# Patient Record
Sex: FEMALE | Race: WHITE | HISPANIC OR LATINO | Employment: UNEMPLOYED | URBAN - METROPOLITAN AREA
[De-identification: names, ages, dates, MRNs, and addresses within clinical notes are randomized per-mention and may not be internally consistent; named-entity substitution may affect disease eponyms.]

---

## 2018-03-12 ENCOUNTER — NON-PROVIDER VISIT (OUTPATIENT)
Dept: OBGYN | Facility: CLINIC | Age: 28
End: 2018-03-12

## 2018-03-12 DIAGNOSIS — Z32.01 PREGNANCY EXAMINATION OR TEST, POSITIVE RESULT: ICD-10-CM

## 2018-03-12 LAB
INT CON NEG: NEGATIVE
INT CON POS: POSITIVE
POC URINE PREGNANCY TEST: POSITIVE

## 2018-03-12 PROCEDURE — 81025 URINE PREGNANCY TEST: CPT | Performed by: OBSTETRICS & GYNECOLOGY

## 2018-04-03 ENCOUNTER — HOSPITAL ENCOUNTER (INPATIENT)
Facility: MEDICAL CENTER | Age: 28
LOS: 2 days | End: 2018-04-05
Attending: OBSTETRICS & GYNECOLOGY | Admitting: OBSTETRICS & GYNECOLOGY
Payer: MEDICAID

## 2018-04-03 ENCOUNTER — APPOINTMENT (OUTPATIENT)
Dept: RADIOLOGY | Facility: MEDICAL CENTER | Age: 28
End: 2018-04-03
Attending: STUDENT IN AN ORGANIZED HEALTH CARE EDUCATION/TRAINING PROGRAM
Payer: MEDICAID

## 2018-04-03 LAB
A1 MICROGLOB PLACENTAL VAG QL: POSITIVE
ABO GROUP BLD: NORMAL
APPEARANCE UR: ABNORMAL
BASOPHILS # BLD AUTO: 0.3 % (ref 0–1.8)
BASOPHILS # BLD AUTO: 0.3 % (ref 0–1.8)
BASOPHILS # BLD: 0.03 K/UL (ref 0–0.12)
BASOPHILS # BLD: 0.03 K/UL (ref 0–0.12)
BLD GP AB SCN SERPL QL: NORMAL
COLOR UR AUTO: ABNORMAL
EOSINOPHIL # BLD AUTO: 0.01 K/UL (ref 0–0.51)
EOSINOPHIL # BLD AUTO: 0.02 K/UL (ref 0–0.51)
EOSINOPHIL NFR BLD: 0.1 % (ref 0–6.9)
EOSINOPHIL NFR BLD: 0.2 % (ref 0–6.9)
ERYTHROCYTE [DISTWIDTH] IN BLOOD BY AUTOMATED COUNT: 42 FL (ref 35.9–50)
ERYTHROCYTE [DISTWIDTH] IN BLOOD BY AUTOMATED COUNT: 42.3 FL (ref 35.9–50)
GLUCOSE UR QL STRIP.AUTO: NEGATIVE MG/DL
GP B STREP DNA SPEC QL NAA+PROBE: NEGATIVE
HBV SURFACE AG SER QL: NEGATIVE
HCT VFR BLD AUTO: 38.4 % (ref 37–47)
HCT VFR BLD AUTO: 38.4 % (ref 37–47)
HGB BLD-MCNC: 12.6 G/DL (ref 12–16)
HGB BLD-MCNC: 12.7 G/DL (ref 12–16)
HIV 1+2 AB+HIV1 P24 AG SERPL QL IA: NON REACTIVE
IMM GRANULOCYTES # BLD AUTO: 0.04 K/UL (ref 0–0.11)
IMM GRANULOCYTES # BLD AUTO: 0.05 K/UL (ref 0–0.11)
IMM GRANULOCYTES NFR BLD AUTO: 0.4 % (ref 0–0.9)
IMM GRANULOCYTES NFR BLD AUTO: 0.5 % (ref 0–0.9)
KETONES UR QL STRIP.AUTO: 80 MG/DL
LEUKOCYTE ESTERASE UR QL STRIP.AUTO: ABNORMAL
LYMPHOCYTES # BLD AUTO: 2.04 K/UL (ref 1–4.8)
LYMPHOCYTES # BLD AUTO: 2.27 K/UL (ref 1–4.8)
LYMPHOCYTES NFR BLD: 21.1 % (ref 22–41)
LYMPHOCYTES NFR BLD: 21.4 % (ref 22–41)
MCH RBC QN AUTO: 27.9 PG (ref 27–33)
MCH RBC QN AUTO: 28.2 PG (ref 27–33)
MCHC RBC AUTO-ENTMCNC: 32.8 G/DL (ref 33.6–35)
MCHC RBC AUTO-ENTMCNC: 33.1 G/DL (ref 33.6–35)
MCV RBC AUTO: 85.1 FL (ref 81.4–97.8)
MCV RBC AUTO: 85.3 FL (ref 81.4–97.8)
MONOCYTES # BLD AUTO: 0.56 K/UL (ref 0–0.85)
MONOCYTES # BLD AUTO: 0.56 K/UL (ref 0–0.85)
MONOCYTES NFR BLD AUTO: 5.3 % (ref 0–13.4)
MONOCYTES NFR BLD AUTO: 5.8 % (ref 0–13.4)
NEUTROPHILS # BLD AUTO: 6.98 K/UL (ref 2–7.15)
NEUTROPHILS # BLD AUTO: 7.68 K/UL (ref 2–7.15)
NEUTROPHILS NFR BLD: 72.1 % (ref 44–72)
NEUTROPHILS NFR BLD: 72.5 % (ref 44–72)
NITRITE UR QL STRIP.AUTO: NEGATIVE
NRBC # BLD AUTO: 0 K/UL
NRBC # BLD AUTO: 0 K/UL
NRBC BLD-RTO: 0 /100 WBC
NRBC BLD-RTO: 0 /100 WBC
PH UR STRIP.AUTO: 5.5 [PH]
PLATELET # BLD AUTO: 278 K/UL (ref 164–446)
PLATELET # BLD AUTO: 294 K/UL (ref 164–446)
PMV BLD AUTO: 10.9 FL (ref 9–12.9)
PMV BLD AUTO: 10.9 FL (ref 9–12.9)
PROT UR QL STRIP: 30 MG/DL
RBC # BLD AUTO: 4.5 M/UL (ref 4.2–5.4)
RBC # BLD AUTO: 4.51 M/UL (ref 4.2–5.4)
RBC UR QL AUTO: ABNORMAL
RH BLD: NORMAL
RUBV AB SER QL: 411 IU/ML
SP GR UR: 1.02
TREPONEMA PALLIDUM IGG+IGM AB [PRESENCE] IN SERUM OR PLASMA BY IMMUNOASSAY: NON REACTIVE
WBC # BLD AUTO: 10.6 K/UL (ref 4.8–10.8)
WBC # BLD AUTO: 9.7 K/UL (ref 4.8–10.8)

## 2018-04-03 PROCEDURE — 36415 COLL VENOUS BLD VENIPUNCTURE: CPT

## 2018-04-03 PROCEDURE — 303615 HCHG EPIDURAL/SPINAL ANESTHESIA FOR LABOR

## 2018-04-03 PROCEDURE — 85025 COMPLETE CBC W/AUTO DIFF WBC: CPT

## 2018-04-03 PROCEDURE — 304965 HCHG RECOVERY SERVICES

## 2018-04-03 PROCEDURE — 4A1HXCZ MONITORING OF PRODUCTS OF CONCEPTION, CARDIAC RATE, EXTERNAL APPROACH: ICD-10-PCS | Performed by: OBSTETRICS & GYNECOLOGY

## 2018-04-03 PROCEDURE — 76815 OB US LIMITED FETUS(S): CPT

## 2018-04-03 PROCEDURE — 700111 HCHG RX REV CODE 636 W/ 250 OVERRIDE (IP)

## 2018-04-03 PROCEDURE — 770002 HCHG ROOM/CARE - OB PRIVATE (112)

## 2018-04-03 PROCEDURE — 81002 URINALYSIS NONAUTO W/O SCOPE: CPT

## 2018-04-03 PROCEDURE — 86762 RUBELLA ANTIBODY: CPT

## 2018-04-03 PROCEDURE — 86780 TREPONEMA PALLIDUM: CPT

## 2018-04-03 PROCEDURE — 87340 HEPATITIS B SURFACE AG IA: CPT

## 2018-04-03 PROCEDURE — 700101 HCHG RX REV CODE 250

## 2018-04-03 PROCEDURE — 700105 HCHG RX REV CODE 258: Performed by: STUDENT IN AN ORGANIZED HEALTH CARE EDUCATION/TRAINING PROGRAM

## 2018-04-03 PROCEDURE — 700111 HCHG RX REV CODE 636 W/ 250 OVERRIDE (IP): Performed by: STUDENT IN AN ORGANIZED HEALTH CARE EDUCATION/TRAINING PROGRAM

## 2018-04-03 PROCEDURE — 87653 STREP B DNA AMP PROBE: CPT

## 2018-04-03 PROCEDURE — 87389 HIV-1 AG W/HIV-1&-2 AB AG IA: CPT

## 2018-04-03 PROCEDURE — 59409 OBSTETRICAL CARE: CPT

## 2018-04-03 PROCEDURE — 84112 EVAL AMNIOTIC FLUID PROTEIN: CPT

## 2018-04-03 RX ORDER — MISOPROSTOL 200 UG/1
800 TABLET ORAL
Status: DISCONTINUED | OUTPATIENT
Start: 2018-04-03 | End: 2018-04-03 | Stop reason: HOSPADM

## 2018-04-03 RX ORDER — ONDANSETRON 2 MG/ML
4 INJECTION INTRAMUSCULAR; INTRAVENOUS EVERY 6 HOURS PRN
Status: DISCONTINUED | OUTPATIENT
Start: 2018-04-03 | End: 2018-04-05 | Stop reason: HOSPADM

## 2018-04-03 RX ORDER — HYDROXYZINE 50 MG/1
50 TABLET, FILM COATED ORAL EVERY 6 HOURS PRN
Status: DISCONTINUED | OUTPATIENT
Start: 2018-04-03 | End: 2018-04-03 | Stop reason: HOSPADM

## 2018-04-03 RX ORDER — SODIUM CHLORIDE, SODIUM LACTATE, POTASSIUM CHLORIDE, CALCIUM CHLORIDE 600; 310; 30; 20 MG/100ML; MG/100ML; MG/100ML; MG/100ML
INJECTION, SOLUTION INTRAVENOUS CONTINUOUS
Status: DISCONTINUED | OUTPATIENT
Start: 2018-04-03 | End: 2018-04-03 | Stop reason: HOSPADM

## 2018-04-03 RX ORDER — SODIUM CHLORIDE, SODIUM LACTATE, POTASSIUM CHLORIDE, CALCIUM CHLORIDE 600; 310; 30; 20 MG/100ML; MG/100ML; MG/100ML; MG/100ML
INJECTION, SOLUTION INTRAVENOUS CONTINUOUS
Status: DISCONTINUED | OUTPATIENT
Start: 2018-04-03 | End: 2018-04-05 | Stop reason: HOSPADM

## 2018-04-03 RX ORDER — OXYTOCIN 10 [USP'U]/ML
10 INJECTION, SOLUTION INTRAMUSCULAR; INTRAVENOUS
Status: DISCONTINUED | OUTPATIENT
Start: 2018-04-03 | End: 2018-04-03 | Stop reason: HOSPADM

## 2018-04-03 RX ORDER — ROPIVACAINE HYDROCHLORIDE 2 MG/ML
INJECTION, SOLUTION EPIDURAL; INFILTRATION; PERINEURAL
Status: COMPLETED
Start: 2018-04-03 | End: 2018-04-03

## 2018-04-03 RX ORDER — METHYLERGONOVINE MALEATE 0.2 MG/ML
0.2 INJECTION INTRAVENOUS
Status: DISCONTINUED | OUTPATIENT
Start: 2018-04-03 | End: 2018-04-03 | Stop reason: HOSPADM

## 2018-04-03 RX ORDER — ONDANSETRON 4 MG/1
4 TABLET, ORALLY DISINTEGRATING ORAL EVERY 6 HOURS PRN
Status: DISCONTINUED | OUTPATIENT
Start: 2018-04-03 | End: 2018-04-05 | Stop reason: HOSPADM

## 2018-04-03 RX ORDER — ALUMINA, MAGNESIA, AND SIMETHICONE 2400; 2400; 240 MG/30ML; MG/30ML; MG/30ML
20 SUSPENSION ORAL EVERY 6 HOURS PRN
Status: DISCONTINUED | OUTPATIENT
Start: 2018-04-03 | End: 2018-04-03 | Stop reason: HOSPADM

## 2018-04-03 RX ORDER — CARBOPROST TROMETHAMINE 250 UG/ML
250 INJECTION, SOLUTION INTRAMUSCULAR
Status: DISCONTINUED | OUTPATIENT
Start: 2018-04-03 | End: 2018-04-03 | Stop reason: HOSPADM

## 2018-04-03 RX ORDER — PENICILLIN G POTASSIUM 5000000 [IU]/1
5 INJECTION, POWDER, FOR SOLUTION INTRAMUSCULAR; INTRAVENOUS ONCE
Status: COMPLETED | OUTPATIENT
Start: 2018-04-03 | End: 2018-04-03

## 2018-04-03 RX ORDER — PENICILLIN G POTASSIUM 5000000 [IU]/1
INJECTION, POWDER, FOR SOLUTION INTRAMUSCULAR; INTRAVENOUS
Status: DISCONTINUED
Start: 2018-04-03 | End: 2018-04-04

## 2018-04-03 RX ADMIN — SODIUM CHLORIDE 2.5 MILLION UNITS: 9 INJECTION, SOLUTION INTRAVENOUS at 18:05

## 2018-04-03 RX ADMIN — PENICILLIN G POTASSIUM 5 MILLION UNITS: 5000000 POWDER, FOR SOLUTION INTRAMUSCULAR; INTRAPLEURAL; INTRATHECAL; INTRAVENOUS at 13:37

## 2018-04-03 RX ADMIN — Medication 125 ML/HR: at 20:48

## 2018-04-03 RX ADMIN — SODIUM CHLORIDE, POTASSIUM CHLORIDE, SODIUM LACTATE AND CALCIUM CHLORIDE: 600; 310; 30; 20 INJECTION, SOLUTION INTRAVENOUS at 17:23

## 2018-04-03 RX ADMIN — Medication 1 MILLI-UNITS/MIN: at 14:52

## 2018-04-03 RX ADMIN — ROPIVACAINE HYDROCHLORIDE 100 ML: 2 INJECTION, SOLUTION EPIDURAL; INFILTRATION at 18:01

## 2018-04-03 RX ADMIN — FENTANYL CITRATE 100 MCG: 50 INJECTION, SOLUTION INTRAMUSCULAR; INTRAVENOUS at 16:29

## 2018-04-03 RX ADMIN — FENTANYL CITRATE 100 MCG: 50 INJECTION, SOLUTION INTRAMUSCULAR; INTRAVENOUS at 15:29

## 2018-04-03 RX ADMIN — SODIUM CHLORIDE, POTASSIUM CHLORIDE, SODIUM LACTATE AND CALCIUM CHLORIDE: 600; 310; 30; 20 INJECTION, SOLUTION INTRAVENOUS at 13:00

## 2018-04-03 ASSESSMENT — LIFESTYLE VARIABLES
DO YOU DRINK ALCOHOL: NO
ALCOHOL_USE: NO
EVER_SMOKED: NEVER

## 2018-04-03 ASSESSMENT — PAIN SCALES - GENERAL
PAINLEVEL_OUTOF10: 0
PAINLEVEL_OUTOF10: 5
PAINLEVEL_OUTOF10: 2

## 2018-04-03 NOTE — H&P
"History and Physical      Neyda Jhaveri is a 28 y.o. year old female  at 34w by LMP and 35w6d by US done today who presents for uterine contractions and leaking of fluids     Subjective:   positive  For CTXS.   positive Feels pain   positive for LOF  negative for vaginal bleeding.   positive for fetal movement    No LMP recorded.  Not found.    ROS: Patient denies fever, chills, nausea, vomiting , headache, visual disturbance, or dysuria.    No past medical history on file.  No past surgical history on file.  OB History   No data available     Social History     Social History   • Marital status: Single     Spouse name: N/A   • Number of children: N/A   • Years of education: N/A     Occupational History   • Not on file.     Social History Main Topics   • Smoking status: Not on file   • Smokeless tobacco: Not on file   • Alcohol use Not on file   • Drug use: Unknown   • Sexual activity: Not on file     Other Topics Concern   • Not on file     Social History Narrative   • No narrative on file     Allergies: Patient has no known allergies.  No current facility-administered medications for this encounter.     Prenatal care in [place] and no prenatal care since the patient moved to [place] 1m ago. No documentation form [place] available at this time.     There are no active problems to display for this patient.      Objective:      Blood pressure 111/70, pulse 86, height 1.68 m (5' 6.14\"), weight 68 kg (149 lb 14.6 oz).    General:   Afebrile, NAD   Skin:   No rash or lesion   HEENT:  NCAT, EOMI, non-icteric, mmm   Lungs:   CTAB   Heart:   RRR, NMGR   Abdomen:   gravid, NT   EFW:  2700-3000g   Pelvis:  adequate with gynecoid pelvis   FHT:  150 BPM   Uterine Size: S=D   Presentations: Cephalic   Cervix:     Dilation: 1cm    Effacement: 50%    Station:  -2    Consistency: Medium    Position: Middle     Lab Review  Lab:   Blood type: O     Recent Results (from the past 5880 hour(s))   POCT Pregnancy    " Collection Time: 03/12/18  1:22 PM   Result Value Ref Range    POC Urine Pregnancy Test Positive Negative    Internal Control Positive Positive     Internal Control Negative Negative    IP OBSTETRIC PANEL-BB (ABORH/ANTIBODY SCREEN)    Collection Time: 04/03/18  9:29 AM   Result Value Ref Range    ABO Grouping Only O     Rh Grouping Only POS     Antibody Screen Scrn NEG    OBSTETRIC PANEL (CBCD, RPR, RUBELLA, HBSAG)    Collection Time: 04/03/18  9:31 AM   Result Value Ref Range    WBC 9.7 4.8 - 10.8 K/uL    RBC 4.50 4.20 - 5.40 M/uL    Hemoglobin 12.7 12.0 - 16.0 g/dL    Hematocrit 38.4 37.0 - 47.0 %    MCV 85.3 81.4 - 97.8 fL    MCH 28.2 27.0 - 33.0 pg    MCHC 33.1 (L) 33.6 - 35.0 g/dL    RDW 42.0 35.9 - 50.0 fL    Platelet Count 278 164 - 446 K/uL    MPV 10.9 9.0 - 12.9 fL    Neutrophils-Polys 72.10 (H) 44.00 - 72.00 %    Lymphocytes 21.10 (L) 22.00 - 41.00 %    Monocytes 5.80 0.00 - 13.40 %    Eosinophils 0.20 0.00 - 6.90 %    Basophils 0.30 0.00 - 1.80 %    Immature Granulocytes 0.50 0.00 - 0.90 %    Nucleated RBC 0.00 /100 WBC    Neutrophils (Absolute) 6.98 2.00 - 7.15 K/uL    Lymphs (Absolute) 2.04 1.00 - 4.80 K/uL    Monos (Absolute) 0.56 0.00 - 0.85 K/uL    Eos (Absolute) 0.02 0.00 - 0.51 K/uL    Baso (Absolute) 0.03 0.00 - 0.12 K/uL    Immature Granulocytes (abs) 0.05 0.00 - 0.11 K/uL    NRBC (Absolute) 0.00 K/uL    Rubella IgG Antibody 411.00 IU/mL    Syphilis, Treponemal Qual Non Reactive Non Reactive    Hepatitis B Surface Antigen Negative Negative   HIV AG/AB COMBO ASSAY SCREENING    Collection Time: 04/03/18  9:31 AM   Result Value Ref Range    HIV Ag/Ab Combo Assay Non Reactive Non Reactive   AMNISURE ROM ASSAY    Collection Time: 04/03/18 11:00 AM   Result Value Ref Range    AmniSure ROM Positive (AA) Negative        Assessment:   Neyda Jhaveri at Unknown  Labor status: SROM, early labor   GBS unknown, pending  PNL wnl  Amnisure is positive->SROM  Contractions every 2-3 min     Plan:      Admit to L&D  IV Fluids   Pain management  Will start oxytocin  GBS unknown, will give Penicillin G Q4Hrs   Anticipate vaginal delivery

## 2018-04-03 NOTE — PROGRESS NOTES
0850-pt presents from home with c/o leaking, states that she has had minimal PNC in [place], but had a positive pregnancy test here in town in March, but then went back to [place], pt states that she tried to get care here, but no one would see her as a patient, pt states that she is 34 weeks today based off her LMP, no US this pregnancy, no c/o bleeding, pain or uc's, states baby is moving  0900-TC Dr Samaniego, report given, orders for US and prenatal labs  1016-US here  1043-back on monitors, amnisure collected and sent  1200-US results back, placenta is anterior, gestational age around 35 6/7 week, SVE 1/50/-2, GBS swab collected  1230-amnisure back positive, TC Dr Samaniego, report given, admission order received, GBS swab sent  1245-report given to DEANNA Saleh RN, POC discussed

## 2018-04-03 NOTE — PROGRESS NOTES
1245- Report received from DIONI Major RN. Awaiting order from Dr. Briscoe who is still in a delivery.     1337- Penicillin given per MAR for unknown GBS. Patient educated via iPad .    1445- Orders received to start pitocin. Patient mikael too much for gel. Patient transferred to labor room. Pitocin started per MAR.     1530- SVE as noted. Patient requesting fentanyl.    1700- Patient requesting epidural. Dr. Dias in OR.    1745- Dr. Dias to bedside. Epidural placed without complications.     1800- Catheter placed. SVE as noted.     1900- Report to ROBYN Mccabe RN. POC discussed.

## 2018-04-04 LAB
ERYTHROCYTE [DISTWIDTH] IN BLOOD BY AUTOMATED COUNT: 42.1 FL (ref 35.9–50)
GP B STREP DNA SPEC QL NAA+PROBE: NEGATIVE
HCT VFR BLD AUTO: 36.5 % (ref 37–47)
HGB BLD-MCNC: 11.8 G/DL (ref 12–16)
MCH RBC QN AUTO: 27.6 PG (ref 27–33)
MCHC RBC AUTO-ENTMCNC: 32.3 G/DL (ref 33.6–35)
MCV RBC AUTO: 85.5 FL (ref 81.4–97.8)
PLATELET # BLD AUTO: 252 K/UL (ref 164–446)
PMV BLD AUTO: 10.9 FL (ref 9–12.9)
RBC # BLD AUTO: 4.27 M/UL (ref 4.2–5.4)
WBC # BLD AUTO: 14 K/UL (ref 4.8–10.8)

## 2018-04-04 PROCEDURE — 700102 HCHG RX REV CODE 250 W/ 637 OVERRIDE(OP): Performed by: NURSE PRACTITIONER

## 2018-04-04 PROCEDURE — 36415 COLL VENOUS BLD VENIPUNCTURE: CPT

## 2018-04-04 PROCEDURE — A9270 NON-COVERED ITEM OR SERVICE: HCPCS | Performed by: NURSE PRACTITIONER

## 2018-04-04 PROCEDURE — 90715 TDAP VACCINE 7 YRS/> IM: CPT

## 2018-04-04 PROCEDURE — 770002 HCHG ROOM/CARE - OB PRIVATE (112)

## 2018-04-04 PROCEDURE — A9270 NON-COVERED ITEM OR SERVICE: HCPCS | Performed by: STUDENT IN AN ORGANIZED HEALTH CARE EDUCATION/TRAINING PROGRAM

## 2018-04-04 PROCEDURE — 700112 HCHG RX REV CODE 229

## 2018-04-04 PROCEDURE — 90471 IMMUNIZATION ADMIN: CPT

## 2018-04-04 PROCEDURE — 700112 HCHG RX REV CODE 229: Performed by: NURSE PRACTITIONER

## 2018-04-04 PROCEDURE — 85027 COMPLETE CBC AUTOMATED: CPT

## 2018-04-04 PROCEDURE — 700102 HCHG RX REV CODE 250 W/ 637 OVERRIDE(OP): Performed by: STUDENT IN AN ORGANIZED HEALTH CARE EDUCATION/TRAINING PROGRAM

## 2018-04-04 RX ORDER — HYDROCODONE BITARTRATE AND ACETAMINOPHEN 10; 325 MG/1; MG/1
1 TABLET ORAL EVERY 4 HOURS PRN
Status: DISCONTINUED | OUTPATIENT
Start: 2018-04-04 | End: 2018-04-05 | Stop reason: HOSPADM

## 2018-04-04 RX ORDER — SODIUM CHLORIDE, SODIUM LACTATE, POTASSIUM CHLORIDE, CALCIUM CHLORIDE 600; 310; 30; 20 MG/100ML; MG/100ML; MG/100ML; MG/100ML
INJECTION, SOLUTION INTRAVENOUS PRN
Status: DISCONTINUED | OUTPATIENT
Start: 2018-04-04 | End: 2018-04-05 | Stop reason: HOSPADM

## 2018-04-04 RX ORDER — DOCUSATE SODIUM 100 MG/1
100 CAPSULE, LIQUID FILLED ORAL 2 TIMES DAILY PRN
Status: DISCONTINUED | OUTPATIENT
Start: 2018-04-04 | End: 2018-04-05 | Stop reason: HOSPADM

## 2018-04-04 RX ORDER — VITAMIN A ACETATE, BETA CAROTENE, ASCORBIC ACID, CHOLECALCIFEROL, .ALPHA.-TOCOPHEROL ACETATE, DL-, THIAMINE MONONITRATE, RIBOFLAVIN, NIACINAMIDE, PYRIDOXINE HYDROCHLORIDE, FOLIC ACID, CYANOCOBALAMIN, CALCIUM CARBONATE, FERROUS FUMARATE, ZINC OXIDE, CUPRIC OXIDE 3080; 12; 120; 400; 1; 1.84; 3; 20; 22; 920; 25; 200; 27; 10; 2 [IU]/1; UG/1; MG/1; [IU]/1; MG/1; MG/1; MG/1; MG/1; MG/1; [IU]/1; MG/1; MG/1; MG/1; MG/1; MG/1
1 TABLET, FILM COATED ORAL EVERY MORNING
Status: DISCONTINUED | OUTPATIENT
Start: 2018-04-04 | End: 2018-04-05 | Stop reason: HOSPADM

## 2018-04-04 RX ORDER — IBUPROFEN 600 MG/1
600 TABLET ORAL EVERY 6 HOURS PRN
Status: DISCONTINUED | OUTPATIENT
Start: 2018-04-04 | End: 2018-04-05 | Stop reason: HOSPADM

## 2018-04-04 RX ORDER — MISOPROSTOL 200 UG/1
600 TABLET ORAL
Status: DISCONTINUED | OUTPATIENT
Start: 2018-04-04 | End: 2018-04-05 | Stop reason: HOSPADM

## 2018-04-04 RX ORDER — HYDROCODONE BITARTRATE AND ACETAMINOPHEN 5; 325 MG/1; MG/1
1 TABLET ORAL EVERY 4 HOURS PRN
Status: DISCONTINUED | OUTPATIENT
Start: 2018-04-04 | End: 2018-04-05 | Stop reason: HOSPADM

## 2018-04-04 RX ORDER — MAG HYDROX/ALUMINUM HYD/SIMETH 400-400-40
1 SUSPENSION, ORAL (FINAL DOSE FORM) ORAL
Status: DISCONTINUED | OUTPATIENT
Start: 2018-04-04 | End: 2018-04-05 | Stop reason: HOSPADM

## 2018-04-04 RX ORDER — METHYLERGONOVINE MALEATE 0.2 MG/ML
0.2 INJECTION INTRAVENOUS
Status: DISCONTINUED | OUTPATIENT
Start: 2018-04-04 | End: 2018-04-05 | Stop reason: HOSPADM

## 2018-04-04 RX ORDER — ACETAMINOPHEN 325 MG/1
325 TABLET ORAL EVERY 4 HOURS PRN
Status: DISCONTINUED | OUTPATIENT
Start: 2018-04-04 | End: 2018-04-05 | Stop reason: HOSPADM

## 2018-04-04 RX ORDER — BISACODYL 10 MG
10 SUPPOSITORY, RECTAL RECTAL PRN
Status: DISCONTINUED | OUTPATIENT
Start: 2018-04-04 | End: 2018-04-05 | Stop reason: HOSPADM

## 2018-04-04 RX ORDER — CARBOPROST TROMETHAMINE 250 UG/ML
250 INJECTION, SOLUTION INTRAMUSCULAR
Status: DISCONTINUED | OUTPATIENT
Start: 2018-04-04 | End: 2018-04-05 | Stop reason: HOSPADM

## 2018-04-04 RX ADMIN — DOCUSATE SODIUM 100 MG: 100 CAPSULE ORAL at 09:01

## 2018-04-04 RX ADMIN — Medication 1 TABLET: at 09:01

## 2018-04-04 RX ADMIN — IBUPROFEN 600 MG: 600 TABLET, FILM COATED ORAL at 02:12

## 2018-04-04 RX ADMIN — IBUPROFEN 600 MG: 600 TABLET, FILM COATED ORAL at 09:01

## 2018-04-04 RX ADMIN — TETANUS TOXOID, REDUCED DIPHTHERIA TOXOID AND ACELLULAR PERTUSSIS VACCINE, ADSORBED 0.5 ML: 5; 2.5; 8; 8; 2.5 SUSPENSION INTRAMUSCULAR at 06:02

## 2018-04-04 ASSESSMENT — PAIN SCALES - GENERAL
PAINLEVEL_OUTOF10: 0
PAINLEVEL_OUTOF10: 2
PAINLEVEL_OUTOF10: 0
PAINLEVEL_OUTOF10: 2
PAINLEVEL_OUTOF10: 0
PAINLEVEL_OUTOF10: 0
PAINLEVEL_OUTOF10: 3
PAINLEVEL_OUTOF10: 0

## 2018-04-04 ASSESSMENT — PATIENT HEALTH QUESTIONNAIRE - PHQ9
6. FEELING BAD ABOUT YOURSELF - OR THAT YOU ARE A FAILURE OR HAVE LET YOURSELF OR YOUR FAMILY DOWN: NOT AL ALL
9. THOUGHTS THAT YOU WOULD BE BETTER OFF DEAD, OR OF HURTING YOURSELF: NOT AT ALL
7. TROUBLE CONCENTRATING ON THINGS, SUCH AS READING THE NEWSPAPER OR WATCHING TELEVISION: NOT AT ALL
SUM OF ALL RESPONSES TO PHQ QUESTIONS 1-9: 0
3. TROUBLE FALLING OR STAYING ASLEEP OR SLEEPING TOO MUCH: NOT AT ALL
2. FEELING DOWN, DEPRESSED, IRRITABLE, OR HOPELESS: NOT AT ALL
1. LITTLE INTEREST OR PLEASURE IN DOING THINGS: NOT AT ALL
5. POOR APPETITE OR OVEREATING: NOT AT ALL
8. MOVING OR SPEAKING SO SLOWLY THAT OTHER PEOPLE COULD HAVE NOTICED. OR THE OPPOSITE, BEING SO FIGETY OR RESTLESS THAT YOU HAVE BEEN MOVING AROUND A LOT MORE THAN USUAL: NOT AT ALL
4. FEELING TIRED OR HAVING LITTLE ENERGY: NOT AT ALL
SUM OF ALL RESPONSES TO PHQ9 QUESTIONS 1 AND 2: 0

## 2018-04-04 ASSESSMENT — LIFESTYLE VARIABLES: DO YOU DRINK ALCOHOL: NO

## 2018-04-04 NOTE — CARE PLAN
Problem: Potential for postpartum infection related to presence of episiotomy/vaginal tear and/or uterine contamination  Goal: Patient will be absent from signs and symptoms of infection  Outcome: PROGRESSING AS EXPECTED  Afebrile, vss, no s/s of infection    Problem: Alteration in comfort related to episiotomy, vaginal repair and/or after birth pains  Goal: Patient is able to ambulate, care for self and infant  Outcome: PROGRESSING AS EXPECTED  Pain controlled, up ambulating with steady gait

## 2018-04-04 NOTE — PROGRESS NOTES
Patient admited to postpartum from L&D via wheelchair. IV patent and infusing second bag of LR with pitocin. Fundus firm at umbilicus with light lochia. VSS. Oriented to surroundings and use of call light,emergency and tv system.

## 2018-04-04 NOTE — PROCEDURES
SPONTANEOUS VAGINAL DELIVERY PROCEDURE NOTE    PATIENT ID:  NAME:  Neyda Jhaveri  MRN:               [id number]  YOB: 1990    Labor Course: Patient was admitted to Labor and Delivery at 35w6d per US for active labor and SROM.  Patient presents for SROM and CTXs, has no prenatal care in [place], some prenatal care in [place], unable to obtain records. Stated on pcn for unknown GBS and pitocin for augmentation of labor. Epidural placed for pain management.  Labor progressed normally to complete, pushing efforts were effective.    On 4/3/2018  at 19:06, this 28 y.o., now  Unknown , GBS unknown female delivered via  under epidural anesthesia a viable female infant weight pending skin to skin transition with APGAR scores of 9 and 9 at one and five minutes. The infant head delivered in BIN with rotation to ROT. There was no nuchal cord and infants shoulders and rest of body delivered uneventfully. Infant was placed on mothers abdomen immediately following delivery. As infant was vigorous, delayed cord clamping occurred.  Cord was then doubly clamped, cut and infant remained on mothers chest. An intact placenta was delivered spontaneously at 19:09 with 3 vessel cord. Upon vaginal exam, there was an intact perineum. Estimated blood loss was <300cc. Patient will be transferred to postpartum in stable condition and infant to  nursery.      Delivery attended by Yolie Gregorio CNM, APRN.  Dr. Vazquez attending

## 2018-04-04 NOTE — PROGRESS NOTES
Post Partum Progress Note    Name:   Neyda Jhaveri   Date/Time:  4/4/2018 - 7:23 AM  Chief Admitting Dx:  Pregnancy  Labor and delivery indication for care or intervention; WALK-IN, NO PRENATAL CARE  Pregnancy  Delivery Type:  vaginal, spontaneous  Post-Op/Post Partum Days #:  1    Subjective:  Abdominal pain: yes  Ambulating:   yes  Tolerating liquids:  yes  Tolerating food:  yes common adult  Flatus:   yes  BM:    no  Bleeding:   without any bleeding  Voiding:   yes  Dizziness:   no  Feeding:   breast    Vitals:    04/03/18 2013 04/03/18 2100 04/04/18 0000 04/04/18 0400   BP: 110/53 103/58 100/50 (!) 90/60   Pulse: 97 77 85 74   Resp:  16 16 16   Temp:  36.6 °C (97.8 °F) 37.1 °C (98.7 °F) 36.9 °C (98.5 °F)   TempSrc:       SpO2:  97% 95% 93%   Weight:       Height:           Exam:  Breast: Tenderness no  Abdomen: Abdomen soft, non-tender. BS normal. No masses,  No organomegaly  Fundal Tenderness:  no  Fundus Firm: yes  Below umbilicus: yes  Perineum: perineum intact  Lochia: mild  Extremities: Normal extremities, peripheral pulses and reflexes normal    Meds:  Current Facility-Administered Medications   Medication Dose   • ibuprofen (MOTRIN) tablet 600 mg  600 mg   • acetaminophen (TYLENOL) tablet 325 mg  325 mg   • HYDROcodone-acetaminophen (NORCO) 5-325 MG per tablet 1 Tab  1 Tab   • HYDROcodone/acetaminophen (NORCO)  MG per tablet 1 Tab  1 Tab   • LR infusion     • PRN oxytocin (PITOCIN) (20 Units/1000 mL) PRN for excessive uterine bleeding - See Admin Instr  125-999 mL/hr   • miSOPROStol (CYTOTEC) tablet 600 mcg  600 mcg   • methylergonovine (METHERGINE) injection 0.2 mg  0.2 mg   • carboPROST (HEMABATE) injection 250 mcg  250 mcg   • docusate sodium (COLACE) capsule 100 mg  100 mg   • bisacodyl (DULCOLAX) suppository 10 mg  10 mg   • glycerin (adult) suppository 1 Suppository  1 Suppository   • magnesium hydroxide (MILK OF MAGNESIA) suspension 30 mL  30 mL   • prenatal plus vitamin  (STUARTNATAL 1+1) 27-1 MG tablet 1 Tab  1 Tab   • ondansetron (ZOFRAN ODT) dispertab 4 mg  4 mg    Or   • ondansetron (ZOFRAN) syringe/vial injection 4 mg  4 mg   • oxytocin (PITOCIN) infusion (for postpartum)  2,000 mL/hr    Followed by   • oxytocin (PITOCIN) infusion (for postpartum)   mL/hr   • oxytocin (PITOCIN) 20 UNITS/1000ML LR (induction of labor)  0.5-20 lucretia-units/min   • lactated ringers infusion         Labs:   Recent Labs      04/03/18   0931  04/03/18   1320  04/04/18   0316   WBC  9.7  10.6  14.0*   RBC  4.50  4.51  4.27   HEMOGLOBIN  12.7  12.6  11.8*   HEMATOCRIT  38.4  38.4  36.5*   MCV  85.3  85.1  85.5   MCH  28.2  27.9  27.6   MCHC  33.1*  32.8*  32.3*   RDW  42.0  42.3  42.1   PLATELETCT  278  294  252   MPV  10.9  10.9  10.9       Assessment:  Chief Admitting Dx:  Pregnancy  Labor and delivery indication for care or intervention  Pregnancy  Delivery Type:  vaginal, spontaneous  Tubal Ligation:  no    Plan:  Continue routine post partum care  Continue to breast feed  Baby to stay 48 hours, walk-in, no prenatal care  For d/c home tomorrow PPD 2    Kimberly Casey M.D.

## 2018-04-04 NOTE — PROGRESS NOTES
1900-report from DEANNA Saleh RN.   1906-delivery of viable female, APGARs 9/9.   2045-pt up to bathroom, stable on feet, able to void. Marybeth-care done.   2100-pt transferred to post-partum in stable condition. Report given to Wendy NAJERA.

## 2018-04-04 NOTE — CARE PLAN
Problem: Altered physiologic condition related to immediate post-delivery state and potential for bleeding/hemorrhage  Goal: Patient physiologically stable as evidenced by normal lochia, palpable uterine involution and vital signs within normal limits  Outcome: PROGRESSING AS EXPECTED  Fundus firm at one below umbilicus with light lochia.    Problem: Potential for postpartum infection related to presence of episiotomy/vaginal tear and/or uterine contamination  Goal: Patient will be absent from signs and symptoms of infection  Outcome: PROGRESSING AS EXPECTED  Vital signs within acceptable range.    Problem: Alteration in comfort related to episiotomy, vaginal repair and/or after birth pains  Goal: Patient is able to ambulate, care for self and infant  Outcome: PROGRESSING AS EXPECTED  Ambulating  and voiding without difficulty.  Goal: Patient verbalizes acceptable pain level  Outcome: PROGRESSING AS EXPECTED  Verbalizes acceptable pain relief with pain medication to be  given as requested.Denies pain at this time and instructed patient to call as needed.    Problem: Potential knowledge deficit related to lack of understanding of self and  care  Goal: Patient will demonstrate ability to care for self and infant  Outcome: PROGRESSING AS EXPECTED  Bonding with infant with minimal assistance with breast feeding.

## 2018-04-05 VITALS
HEIGHT: 66 IN | HEART RATE: 72 BPM | RESPIRATION RATE: 18 BRPM | BODY MASS INDEX: 24.09 KG/M2 | WEIGHT: 149.91 LBS | TEMPERATURE: 98.9 F | DIASTOLIC BLOOD PRESSURE: 60 MMHG | SYSTOLIC BLOOD PRESSURE: 102 MMHG | OXYGEN SATURATION: 96 %

## 2018-04-05 PROBLEM — O09.30 NO PRENATAL CARE IN CURRENT PREGNANCY, UNSPECIFIED TRIMESTER: Status: ACTIVE | Noted: 2018-04-05

## 2018-04-05 PROCEDURE — 700102 HCHG RX REV CODE 250 W/ 637 OVERRIDE(OP): Performed by: STUDENT IN AN ORGANIZED HEALTH CARE EDUCATION/TRAINING PROGRAM

## 2018-04-05 PROCEDURE — A9270 NON-COVERED ITEM OR SERVICE: HCPCS | Performed by: NURSE PRACTITIONER

## 2018-04-05 PROCEDURE — 700102 HCHG RX REV CODE 250 W/ 637 OVERRIDE(OP): Performed by: NURSE PRACTITIONER

## 2018-04-05 PROCEDURE — A9270 NON-COVERED ITEM OR SERVICE: HCPCS | Performed by: STUDENT IN AN ORGANIZED HEALTH CARE EDUCATION/TRAINING PROGRAM

## 2018-04-05 RX ORDER — IBUPROFEN 600 MG/1
600 TABLET ORAL EVERY 6 HOURS PRN
Qty: 30 TAB | Refills: 3 | Status: SHIPPED | OUTPATIENT
Start: 2018-04-05

## 2018-04-05 RX ORDER — VITAMIN A ACETATE, BETA CAROTENE, ASCORBIC ACID, CHOLECALCIFEROL, .ALPHA.-TOCOPHEROL ACETATE, DL-, THIAMINE MONONITRATE, RIBOFLAVIN, NIACINAMIDE, PYRIDOXINE HYDROCHLORIDE, FOLIC ACID, CYANOCOBALAMIN, CALCIUM CARBONATE, FERROUS FUMARATE, ZINC OXIDE, CUPRIC OXIDE 3080; 12; 120; 400; 1; 1.84; 3; 20; 22; 920; 25; 200; 27; 10; 2 [IU]/1; UG/1; MG/1; [IU]/1; MG/1; MG/1; MG/1; MG/1; MG/1; [IU]/1; MG/1; MG/1; MG/1; MG/1; MG/1
1 TABLET, FILM COATED ORAL EVERY MORNING
Qty: 30 TAB | Refills: 3 | Status: SHIPPED | OUTPATIENT
Start: 2018-04-05

## 2018-04-05 RX ADMIN — IBUPROFEN 600 MG: 600 TABLET, FILM COATED ORAL at 06:29

## 2018-04-05 RX ADMIN — Medication 1 TABLET: at 08:26

## 2018-04-05 ASSESSMENT — PAIN SCALES - GENERAL
PAINLEVEL_OUTOF10: 3
PAINLEVEL_OUTOF10: 3
PAINLEVEL_OUTOF10: 0

## 2018-04-05 ASSESSMENT — LIFESTYLE VARIABLES: EVER_SMOKED: NEVER

## 2018-04-05 NOTE — DISCHARGE SUMMARY
Discharge Summary:      Neyda Jhaveri      Admit Date:   4/3/2018  Discharge Date:  2018     Admitting diagnosis:  Pregnancy  Labor and delivery indication for care or intervention  Pregnancy  Discharge Diagnosis: Status post vaginal, spontaneous.  Pregnancy Complications: no prenatal care/walk-in  Tubal Ligation:  no        History:  No past medical history on file.  OB History    Para Term  AB Living   3 1           SAB TAB Ectopic Molar Multiple Live Births                    # Outcome Date GA Lbr Ernesto/2nd Weight Sex Delivery Anes PTL Lv   3             2             1 Para                    Patient has no known allergies.  Patient Active Problem List    Diagnosis Date Noted   • No prenatal care in current pregnancy, unspecified trimester 2018        Hospital Course:   28 y.o. , now para 2, was admitted with the above mentioned diagnosis, underwent Active Labor, vaginal, spontaneous. Patient postpartum course was unremarkable, with progressive advancement in diet , ambulation and toleration of oral analgesia. Patient without complaints today and desires discharge.      Vitals:    18 0000 18 0400 18 0800 18   BP: 100/50 (!) 90/60 (!) 95/60 (!) 96/61   Pulse: 85 74 71 71   Resp: 16 16 18 16   Temp: 37.1 °C (98.7 °F) 36.9 °C (98.5 °F) 36.8 °C (98.3 °F) 36.7 °C (98 °F)   TempSrc:       SpO2: 95% 93% 95% 97%   Weight:       Height:           Current Facility-Administered Medications   Medication Dose   • ibuprofen (MOTRIN) tablet 600 mg  600 mg   • acetaminophen (TYLENOL) tablet 325 mg  325 mg   • HYDROcodone-acetaminophen (NORCO) 5-325 MG per tablet 1 Tab  1 Tab   • HYDROcodone/acetaminophen (NORCO)  MG per tablet 1 Tab  1 Tab   • LR infusion     • PRN oxytocin (PITOCIN) (20 Units/1000 mL) PRN for excessive uterine bleeding - See Admin Instr  125-999 mL/hr   • miSOPROStol (CYTOTEC) tablet 600 mcg  600 mcg   •  methylergonovine (METHERGINE) injection 0.2 mg  0.2 mg   • carboPROST (HEMABATE) injection 250 mcg  250 mcg   • docusate sodium (COLACE) capsule 100 mg  100 mg   • bisacodyl (DULCOLAX) suppository 10 mg  10 mg   • glycerin (adult) suppository 1 Suppository  1 Suppository   • magnesium hydroxide (MILK OF MAGNESIA) suspension 30 mL  30 mL   • prenatal plus vitamin (STUARTNATAL 1+1) 27-1 MG tablet 1 Tab  1 Tab   • ondansetron (ZOFRAN ODT) dispertab 4 mg  4 mg    Or   • ondansetron (ZOFRAN) syringe/vial injection 4 mg  4 mg   • oxytocin (PITOCIN) infusion (for postpartum)   mL/hr   • oxytocin (PITOCIN) 20 UNITS/1000ML LR (induction of labor)  0.5-20 lucretia-units/min   • lactated ringers infusion         Exam:  Breast Exam: negative  Abdomen: Abdomen soft, non-tender. BS normal. No masses,  No organomegaly  Fundus Non Tender: yes  Perineum: perineum intact  Extremity: extremities, peripheral pulses and reflexes normal     Labs:  Recent Labs      04/03/18   0931  04/03/18   1320  04/04/18   0316   WBC  9.7  10.6  14.0*   RBC  4.50  4.51  4.27   HEMOGLOBIN  12.7  12.6  11.8*   HEMATOCRIT  38.4  38.4  36.5*   MCV  85.3  85.1  85.5   MCH  28.2  27.9  27.6   MCHC  33.1*  32.8*  32.3*   RDW  42.0  42.3  42.1   PLATELETCT  278  294  252   MPV  10.9  10.9  10.9        Activity:   Discharge to home  Pelvic Rest x 6 weeks    Assessment:  normal postpartum course  Discharge Assessment: No heavy bleeding or foul vaginal discharge      Follow up: .TPC 6 weeks for vaginal   Discharge Meds:   Current Outpatient Prescriptions   Medication Sig Dispense Refill   • ibuprofen (MOTRIN) 600 MG Tab Take 1 Tab by mouth every 6 hours as needed (For cramping after delivery; do not give if patient is receiving ketorolac (Toradol)). 30 Tab 3   • prenatal plus vitamin (STUARTNATAL 1+1) 27-1 MG Tab tablet Take 1 Tab by mouth every morning. 30 Tab 3       Kimberly Casey M.D.

## 2018-04-05 NOTE — DISCHARGE PLANNING
:    Referral: Social History    Intervention:  Received a referral to see NIKOLAY who recently moved to [place] from [place].  Per medical record, NIKOLAY had prenatal care in [place] and has been in [place] for 1 month.  BOBY met with NIKOLAY and her sister who was at the bedside translating.  NIKOLAY stated she moved to [place] to be closer to her family.  This is her second child and the FOB was back in [place].  BOBY verified NIKOLAY's phone number and address and the face sheet is correct.  She stated she is prepared for the baby: has Medicaid, clothes, diapers, blankets, car seat, and bassinet.  Provided MOB with a list of pediatricians, children's resource list, diaper bank referral, and a list of [facility] locations.  Infant's UDS is negative.      Plan:  Infant is cleared to discharge home with MOB.

## 2018-04-05 NOTE — PROGRESS NOTES
0700 - Report received from DANIA Cruz. Patient care assumed. Chart and orders reviewed  0827 - Pt assessment complete, wnl. Fundus firm with minimal discharge. Pt ambulating to bathroom and voiding without difficulty. Patient denies any dizziness or lightheadedness at this time. Patient denies any calf pain or tenderness at this time. Reviewed use of emergency light. Plan of care discussed with patient for the day. Pain medication plan discussed with patient; patient requesting not to be woken up if pain medications are available; patient states she will call if PRN pain medication is wanted. All questions/concerns addressed at this time. Encouraged to call with needs, will monitor

## 2018-04-05 NOTE — DISCHARGE INSTRUCTIONS
POSTPARTUM DISCHARGE INSTRUCTIONS FOR MOM    YOB: 1990   Age: 28 y.o.               Admit Date: 4/3/2018     Discharge Date: 4/5/2018  Attending Doctor:  Seb Briscoe M.D.                  Allergies:  Patient has no known allergies.    Discharged to home by car. Discharged via wheelchair, hospital escort: Yes.  Special equipment needed: Not Applicable  Belongings with: Personal  Be sure to schedule a follow-up appointment with your primary care doctor or any specialists as instructed.     Discharge Plan:   Diet Plan: Discussed  Activity Level: Discussed  Confirmed Follow up Appointment: Patient to Call and Schedule Appointment  Confirmed Symptoms Management: Discussed  Medication Reconciliation Updated: Yes  Influenza Vaccine Indication: Patient Refuses    REASONS TO CALL YOUR OBSTETRICIAN:  1.   Persistent fever or shaking chills (Temperature higher than 100.4)  2.   Heavy bleeding (soaking more than 1 pad per hour); Passing clots  3.   Foul odor from vagina  4.   Mastitis (Breast infection; breast pain, chills, fever, redness)  5.   Urinary pain, burning or frequency  6.   Episiotomy infection  7.   Severe depression longer than 24 hours    HAND WASHING  · Prior to handling the baby.  · Before breastfeeding or bottle feeding baby.  · After using the bathroom or changing the baby's diaper.    VAGINAL CARE  · Nothing inside vagina for 6 weeks: no sexual intercourse, tampons or douching.  · Bleeding may continue for 2-4 weeks.  Amount may vary.    · Call your physician for heavy bleeding which means soaking more than 1 pad per hour    BIRTH CONTROL  · It is possible to become pregnant at any time after delivery and while breastfeeding.  · Plan to discuss a method of birth control with your physician at your follow up visit. visit.    DIET AND ELIMINATION  · Eating more fiber (bran cereal, fruits, and vegetables) and drinking plenty of fluids will help to avoid constipation.  · Urinary frequency after  "childbirth is normal.    POSTPARTUM BLUES  During the first few days after birth, you may experience a sense of the \"blues\" which may include impatience, irritability or even crying.  These feeling come and go quickly.  However, as many as 1 in 10 women experience emotional symptoms known as postpartum depression.    Postpartum depression:  May start as early as the second or third day after delivery or take several weeks or months to develop.  Symptoms of \"blues\" are present, but are more intense:  Crying spells; loss of appetite; feelings of hopelessness or loss of control; fear of touching the baby; over concern or no concern at all about the baby; little or no concern about your own appearance/caring for yourself; and/or inability to sleep or excessive sleeping.  Contact your physician if you are experiencing any of these symptoms.    Crisis Hotline:  · [place] Crisis Hotline:  6-690-EJXGBFZ  Or 1-906.794.3023  · [place] Crisis Hotline:  1-761.118.7326  Or 989-519-0863    PREVENTING SHAKEN BABY:  If you are angry or stressed, PUT THE BABY IN THE CRIB, step away, take some deep breaths, and wait until you are calm to care for the baby.  DO NOT SHAKE THE BABY.  You are not alone, call a supporter for help.    · Crisis Call Center 24/7 crisis line 953-485-0829 or 1-558.285.7169  · You can also text them, text \"ANSWER\" to 486606    QUIT SMOKING/TOBACCO USE:  I understand the use of any tobacco products increases my chance of suffering from future heart disease and could cause other illnesses which may shorten my life. Quitting the use of tobacco products is the single most important thing I can do to improve my health. For further information on smoking / tobacco cessation call a Toll Free Quit Line at 1-254.185.4381 (*[employer] [place]) or 1-152.504.1991 (American [employer] Association) or you can access the web based program at www.lungusa.org.    · [place] Tobacco Users Help Line:  (506) 202-9886       Toll " Free: 0-013-547-8718  · Quit Tobacco Program [facility] (567)270-2500    DEPRESSION / SUICIDE RISK:  As you are discharged from this [facility] [[email or web address]], it is important to learn how to keep safe from harming yourself.    Recognize the warning signs:  · Abrupt changes in personality, positive or negative- including increase in energy   · Giving away possessions  · Change in eating patterns- significant weight changes-  positive or negative  · Change in sleeping patterns- unable to sleep or sleeping all the time   · Unwillingness or inability to communicate  · Depression  · Unusual sadness, discouragement and loneliness  · Talk of wanting to die  · Neglect of personal appearance   · Rebelliousness- reckless behavior  · Withdrawal from people/activities they love  · Confusion- inability to concentrate     If you or a loved one observes any of these behaviors or has concerns about self-harm, here's what you can do:  · Talk about it- your feelings and reasons for harming yourself  · Remove any means that you might use to hurt yourself (examples: pills, rope, extension cords, firearm)  · Get professional help from the community (Mental Health, Substance Abuse, psychological counseling)  · Do not be alone:Call your Safe Contact- someone whom you trust who will be there for you.  · Call your local CRISIS HOTLINE 957-3562 or 785-701-6678  · Call your local Children's Mobile Crisis Response Team [place] (197) 429-1225 or www.[employer]  · Call the toll free National Suicide Prevention Hotlines   · National Suicide Prevention Lifeline 007-076-VKFN (6191)  · National Hope Line Network 800-SUICIDE (182-3467)    DISCHARGE SURVEY:  Thank you for choosing [facility].  We hope we provided you with very good care.  You may be receiving a survey in the mail.  Please fill it out.  Your opinion is valuable to us.    ADDITIONAL EDUCATIONAL MATERIALS GIVEN TO PATIENT: N/A        My signature on  this form indicates that:  1.  I have reviewed and understand the above information  2.  My questions regarding this information have been answered to my satisfaction.  3.  I have formulated a plan with my discharge nurse to obtain my prescribed medication for home.

## 2018-04-05 NOTE — CARE PLAN
Problem: Altered physiologic condition related to immediate post-delivery state and potential for bleeding/hemorrhage  Goal: Patient physiologically stable as evidenced by normal lochia, palpable uterine involution and vital signs within normal limits  Outcome: PROGRESSING AS EXPECTED  Fundus is firm, lochia is light and vital signs are stable. Will continue to monitor with Q6 hour checks and hourly rounding    Problem: Alteration in comfort related to episiotomy, vaginal repair and/or after birth pains  Goal: Patient is able to ambulate, care for self and infant  Outcome: PROGRESSING AS EXPECTED  Patient has demonstrated that she is able to ambulate and care for self and infant independently. Pain relief is met with PRN medication orders. Will continue to monitor with Q6 hour checks and hourly rounding

## 2018-04-05 NOTE — PROGRESS NOTES
services used. Client ID [id number]. ID bands verified by this RN. Discharge instructions reviewed with MOB and signed by MOB. Follow up appointments and prescriptions reviewed with MOB. Prescriptions given to MOB. All questions addressed at this time. Instructed to press call light when infant strapped in car seat for car seat check. Primary RN will remove unit infant sleep sack and remove Cuddles transponder when parents ready to leave.

## 2018-04-05 NOTE — CARE PLAN
Problem: Altered physiologic condition related to immediate post-delivery state and potential for bleeding/hemorrhage  Goal: Patient physiologically stable as evidenced by normal lochia, palpable uterine involution and vital signs within normal limits  Outcome: PROGRESSING AS EXPECTED  Vital signs stable. Fundus firm, lochia scant     Problem: Alteration in comfort related to episiotomy, vaginal repair and/or after birth pains  Goal: Patient is able to ambulate, care for self and infant  Outcome: PROGRESSING AS EXPECTED  Discussed 0-10 pain scale and available prn pain medications with pt. Pt states pain at acceptable level at this time

## 2018-04-05 NOTE — PROGRESS NOTES
Brought in breast pump and set up for pt. Showed her how to use it and gave her written info. Regular size flanges, pump settings: CPM 80, Suction 21

## 2018-04-05 NOTE — PROGRESS NOTES
Received report from REFUGIO Amado RN. Assessment complete. Pt states pain at acceptable level. Discussed pain management plan with pt. Pt will call for PRN pain meds. Pt educated on the dangers of sleeping with infant. Call light within reach. Pt encouraged to call with needs or concerns.

## 2018-04-06 NOTE — PROGRESS NOTES
Cuddles transponder removed. Unit infant sleep sack removed. Car seat checked and verified by this RN. Infant, MOB and family escorted off unit and to car.